# Patient Record
Sex: MALE | Race: BLACK OR AFRICAN AMERICAN | Employment: FULL TIME | URBAN - METROPOLITAN AREA
[De-identification: names, ages, dates, MRNs, and addresses within clinical notes are randomized per-mention and may not be internally consistent; named-entity substitution may affect disease eponyms.]

---

## 2018-06-14 ENCOUNTER — OFFICE VISIT (OUTPATIENT)
Dept: ORTHOPEDIC SURGERY | Age: 58
End: 2018-06-14

## 2018-06-14 VITALS
HEART RATE: 76 BPM | OXYGEN SATURATION: 94 % | HEIGHT: 68 IN | WEIGHT: 154 LBS | TEMPERATURE: 97.5 F | DIASTOLIC BLOOD PRESSURE: 72 MMHG | SYSTOLIC BLOOD PRESSURE: 114 MMHG | RESPIRATION RATE: 16 BRPM | BODY MASS INDEX: 23.34 KG/M2

## 2018-06-14 DIAGNOSIS — M25.561 RIGHT KNEE PAIN, UNSPECIFIED CHRONICITY: Primary | ICD-10-CM

## 2018-06-14 DIAGNOSIS — M54.5 BILATERAL LOW BACK PAIN, UNSPECIFIED CHRONICITY, WITH SCIATICA PRESENCE UNSPECIFIED: ICD-10-CM

## 2018-06-14 DIAGNOSIS — M51.16 LUMBAR DISC DISEASE WITH RADICULOPATHY: ICD-10-CM

## 2018-06-14 DIAGNOSIS — S86.911A KNEE STRAIN, RIGHT, INITIAL ENCOUNTER: ICD-10-CM

## 2018-06-14 RX ORDER — DIAZEPAM 10 MG/1
10 TABLET ORAL
COMMUNITY
End: 2021-04-20

## 2018-06-14 RX ORDER — SERTRALINE HYDROCHLORIDE 100 MG/1
TABLET, FILM COATED ORAL DAILY
COMMUNITY
End: 2021-08-25

## 2018-06-14 RX ORDER — METHYLPREDNISOLONE 4 MG/1
TABLET ORAL
Qty: 1 DOSE PACK | Refills: 0 | Status: SHIPPED | OUTPATIENT
Start: 2018-06-14 | End: 2018-07-23 | Stop reason: ALTCHOICE

## 2018-06-14 RX ORDER — LORAZEPAM 1 MG/1
TABLET ORAL
COMMUNITY
End: 2021-04-20

## 2018-06-14 NOTE — PROGRESS NOTES
HISTORY OF PRESENT ILLNESS:  Mr. Everette Herzog is a 66 y.o. male who is here for consultation regarding \"right knee pain. \"  He points to pain along the lateral aspect of the right thigh, lateral aspect of the right knee and down the lateral aspect of the right leg. He is status post back surgery in [place] at the [facility] a few year ago. That surgery consisted of an L3-4 fusion, per the patient. He did fairly well after surgery. He did have sciatica prior to that and has had some sciatica since then. He has been doing well until recently until he started developing this pain. This pain is constant, is not aggravated by weightbearing activities. He has pain at rest as well as with weightbearing activities. He is currently wearing a soft brace on the right knee. He has not had any history of trauma to the right knee. He has not noted swelling of the right knee. He does not utilize ambulatory assist.  He is taking only Tylenol for discomfort. PHYSICAL EXAMINATION:  Clinical examination reveals a healthy-appearing thin 66 y.o.  gentleman in mild discomfort. He moves easily on and off the examination table. His soft brace is removed today from the right knee. His right knee examination is completely benign. He has no effusion or soft tissue swelling of the right knee. He has full range of motion of the right knee from full extension to flexion of 135 degrees. He has good ROM  of the right knee. He has good medial laterals and good posterior anterior stability  of the right knee. He has good collateral as well as cruciate ligaments of the right knee. Lachman's test is negative. Latoya's test is negative. Anterior posterior drawer tests are negative. He has good passive range of motion of the right hip without discomfort. Straight leg raise test on the right side does reproduce some pain in the right lower extremity.   Straight leg raise test on the left is negative. Tariq's test on the right side results in some pain along the lateral aspect of the right thigh. Neurovascularly intact to the lower extremities proximally and distally to motor strength and sensation. RADIOGRAPHS:  X-rays of his knees reveal no osseous pathology. He has good joint space in the weightbearing portion of both knees. X-rays of his lumbar spine reveal fusion with pedicle screw fixation at the L3-4 level and cage. He has some mild degenerative disc disease above and below this fusion. IMPRESSION:    1. Degenerative disc disease, lumbar spine, with radicular pain. 2. Right knee strain. RECOMMENDATIONS:  I discussed with the patient that I do not think his symptoms are originating from his right knee. I think it is neurogenic in origin. His wife agrees with this. I started him on a Medrol Dosepak. He has had some success with this in the past.  He will also increase his Neurontin to twice a day. I have referred him to the [address] [place] [address] for further workup and MRI's as necessary having had previous back surgery. All their questions were answered today. Vitals:    06/14/18 1436   BP: 114/72   Pulse: 76   Resp: 16   Temp: 97.5 °F (36.4 °C)   TempSrc: Oral   SpO2: 94%   Weight: 154 lb (69.9 kg)   Height: 5' 7.5\" (1.715 m)   PainSc:   7   PainLoc: Knee       There is no problem list on file for this patient. There are no active problems to display for this patient. Current Outpatient Prescriptions   Medication Sig Dispense Refill    LORazepam (ATIVAN) 1 mg tablet Take  by mouth every four (4) hours as needed for Anxiety.  sertraline (ZOLOFT) 100 mg tablet Take  by mouth daily.  diazePAM (VALIUM) 10 mg tablet Take 10 mg by mouth every six (6) hours as needed for Anxiety.        Allergies   Allergen Reactions    Ibuprofen Rash     Past Medical History:   Diagnosis Date    Anxiety      Past Surgical History:   Procedure Laterality Date  HX BACK SURGERY      SPINAL FUSION,ANT,EA ADNL LEVEL       Family History   Problem Relation Age of Onset    Heart Disease Mother     Cancer Father      Social History   Substance Use Topics    Smoking status: Current Every Day Smoker    Smokeless tobacco: Never Used    Alcohol use Yes

## 2018-06-14 NOTE — LETTER
NOTIFICATION RETURN TO WORK / SCHOOL 
 
6/14/2018 2:57 PM 
 
Mr. Jonathan Lang. 
[address] [facility] [address] To Whom It May Concern: 
 
Yang Geiger Sr. is currently under the care of [address] [place] [address] [place]. Do to his current medical condition, it is advised for him to minimize going up and down steps. It is recommended that he stay on level surfaces as much as possible. If there are questions or concerns please have the patient contact our office. Sincerely, Sandrita Mi MD

## 2018-07-10 ENCOUNTER — TELEPHONE (OUTPATIENT)
Dept: ORTHOPEDIC SURGERY | Age: 58
End: 2018-07-10

## 2018-07-10 NOTE — TELEPHONE ENCOUNTER
Spoke with patient and he states that he has paperwork for Dr. Lucia Smith. I advised him to drop it off at the office and we will have it done in 7-10 business days.

## 2018-07-10 NOTE — TELEPHONE ENCOUNTER
Patient called requesting a call back from Dr. Zenon Gill regarding some paperwork he received from his job. That is all the information that was given. Please advise patient back at [phone].

## 2018-07-20 ENCOUNTER — OFFICE VISIT (OUTPATIENT)
Dept: ORTHOPEDIC SURGERY | Age: 58
End: 2018-07-20

## 2018-07-20 VITALS
SYSTOLIC BLOOD PRESSURE: 106 MMHG | WEIGHT: 157 LBS | DIASTOLIC BLOOD PRESSURE: 70 MMHG | TEMPERATURE: 98.4 F | HEART RATE: 75 BPM | OXYGEN SATURATION: 94 % | BODY MASS INDEX: 23.79 KG/M2 | HEIGHT: 68 IN

## 2018-07-20 DIAGNOSIS — M54.5 BILATERAL LOW BACK PAIN, UNSPECIFIED CHRONICITY, WITH SCIATICA PRESENCE UNSPECIFIED: ICD-10-CM

## 2018-07-20 DIAGNOSIS — M51.16 LUMBAR DISC DISEASE WITH RADICULOPATHY: Primary | ICD-10-CM

## 2018-07-20 NOTE — PROGRESS NOTES
HISTORY OF PRESENT ILLNESS:  Adela Cárdenas is here with his wife today for some paperwork. When I last saw him on June 14, 2018, he had been referred to The [address] [place] [address], but he stated they never called. He was instructed to make an appointment when he left the office last time. He had previous lumbar spine surgery and had radiculopathy in his lower extremities. He does feel that the Medrol Dosepak helped him, and I also increased his Neurontin a little bit. He is much better than he was when I last saw him. PHYSICAL EXAMINATION:   Clinical examination today reveals he is sitting on the exam table without significant discomfort. Sitting straight leg raise test is negative bilaterally. Currently, neurovascular testing is intact to the lower extremities proximal and distal to motor strength and sensation. IMPRESSION:  Multiple level degenerative disc disease, lumbar spine with previous lumbar spine surgery. RECOMMENDATIONS:  He needs to be [facility] up by [facility]. He will continue on his increased dose of Neurontin. This may be adjusted by [facility]. Further work notes will come from [facility] pending his final diagnosis and weight restrictions. All of their questions were answered today. Vitals:    07/20/18 1558   BP: 106/70   Pulse: 75   Temp: 98.4 °F (36.9 °C)   TempSrc: Oral   SpO2: 94%   Weight: 157 lb (71.2 kg)   Height: 5' 7.5\" (1.715 m)   PainSc:   4   PainLoc: Back       There is no problem list on file for this patient. There are no active problems to display for this patient. Current Outpatient Prescriptions   Medication Sig Dispense Refill    LORazepam (ATIVAN) 1 mg tablet Take  by mouth every four (4) hours as needed for Anxiety.  sertraline (ZOLOFT) 100 mg tablet Take  by mouth daily.  diazePAM (VALIUM) 10 mg tablet Take 10 mg by mouth every six (6) hours as needed for Anxiety.       methylPREDNISolone (MEDROL DOSEPACK) 4 mg tablet Per dose pack instructions 1 Dose Pack 0     Allergies   Allergen Reactions    Ibuprofen Rash     Past Medical History:   Diagnosis Date    Anxiety      Past Surgical History:   Procedure Laterality Date    HX BACK SURGERY      SPINAL FUSION,ANT,EA ADNL LEVEL       Family History   Problem Relation Age of Onset    Heart Disease Mother     Cancer Father      Social History   Substance Use Topics    Smoking status: Current Every Day Smoker    Smokeless tobacco: Never Used    Alcohol use Yes

## 2018-07-23 ENCOUNTER — OFFICE VISIT (OUTPATIENT)
Dept: ORTHOPEDIC SURGERY | Age: 58
End: 2018-07-23

## 2018-07-23 ENCOUNTER — DOCUMENTATION ONLY (OUTPATIENT)
Dept: ORTHOPEDIC SURGERY | Age: 58
End: 2018-07-23

## 2018-07-23 VITALS
BODY MASS INDEX: 24.96 KG/M2 | OXYGEN SATURATION: 100 % | HEART RATE: 54 BPM | SYSTOLIC BLOOD PRESSURE: 144 MMHG | RESPIRATION RATE: 16 BRPM | TEMPERATURE: 97.8 F | WEIGHT: 159 LBS | HEIGHT: 67 IN | DIASTOLIC BLOOD PRESSURE: 87 MMHG

## 2018-07-23 DIAGNOSIS — M62.830 MUSCLE SPASM OF BACK: ICD-10-CM

## 2018-07-23 DIAGNOSIS — M47.816 LUMBAR FACET ARTHROPATHY: ICD-10-CM

## 2018-07-23 DIAGNOSIS — M96.1 LUMBAR POST-LAMINECTOMY SYNDROME: ICD-10-CM

## 2018-07-23 DIAGNOSIS — Z72.0 TOBACCO USE: ICD-10-CM

## 2018-07-23 DIAGNOSIS — M54.16 LUMBAR RADICULAR PAIN: Primary | ICD-10-CM

## 2018-07-23 DIAGNOSIS — R29.898 WEAKNESS OF BOTH LEGS: ICD-10-CM

## 2018-07-23 RX ORDER — GABAPENTIN 300 MG/1
CAPSULE ORAL
Qty: 180 CAP | Refills: 3 | Status: SHIPPED | OUTPATIENT
Start: 2018-07-23 | End: 2018-09-04 | Stop reason: SDUPTHER

## 2018-07-23 NOTE — MR AVS SNAPSHOT
[address] [address] [address] [place] [address] 
[phone] Patient: Anna Mariee Sr. MRN: [id number] ZVM:[id number]/4/[id number] Visit Information Date & Time Provider Department Dept. Phone Encounter #  
 7/23/2018  8:00 AM Mary Lucero, [address] [facility] [phone] [id number] Follow-up Instructions Return in about 1 month (around 8/23/2018) for Medication follow up, Diagnostic Test follow up. Upcoming Health Maintenance Date Due Hepatitis C Screening 1960 Pneumococcal 19-64 Medium Risk (1 of 1 - PPSV23) 3/1/1981 DTaP/Tdap/Td series (1 - Tdap) 3/1/1983 FOBT Q 1 YEAR AGE 50-75 3/1/2012 Influenza Age 5 to Adult 8/1/2018 Allergies as of 7/23/2018  Review Complete On: 7/23/2018 By: Mary Lucero MD  
  
 Severity Noted Reaction Type Reactions Ibuprofen  06/14/2018    Rash Current Immunizations  Never Reviewed No immunizations on file. Not reviewed this visit You Were Diagnosed With   
  
 Codes Comments Lumbar radicular pain    -  Primary ICD-10-CM: M54.16 
ICD-9-CM: 724.4 Lumbar facet arthropathy (HCC)     ICD-10-CM: M46.96 
ICD-9-CM: 721.3 Muscle spasm of back     ICD-10-CM: V65.298 ICD-9-CM: 724.8 Weakness of both legs     ICD-10-CM: R29.898 ICD-9-CM: 729.89 Vitals BP Pulse Temp Resp Height(growth percentile) Weight(growth percentile) 144/87 (BP 1 Location: Left arm, BP Patient Position: Sitting) (!) 54 97.8 °F (36.6 °C) (Oral) 16 5' 7\" (1.702 m) 159 lb (72.1 kg) SpO2 BMI Smoking Status 100% 24.9 kg/m2 Current Every Day Smoker BMI and BSA Data Body Mass Index Body Surface Area 24.9 kg/m 2 1.85 m 2 Preferred Pharmacy Pharmacy Name Phone [id number] Leti Kearns, [facility] [address] [phone] Your Updated Medication List  
  
   
 This list is accurate as of 18  9:10 AM.  Always use your most recent med list.  
  
  
  
  
 diazePAM 10 mg tablet Commonly known as:  VALIUM Take 10 mg by mouth every six (6) hours as needed for Anxiety. gabapentin 300 mg capsule Commonly known as:  NEURONTIN  
2 po tid as directed  Indications: NEUROPATHIC PAIN  
  
 LORazepam 1 mg tablet Commonly known as:  ATIVAN Take  by mouth every four (4) hours as needed for Anxiety. ZOLOFT 100 mg tablet Generic drug:  sertraline Take  by mouth daily. Prescriptions Sent to Pharmacy Refills  
 gabapentin (NEURONTIN) 300 mg capsule 3 Si po tid as directed  Indications: NEUROPATHIC PAIN Class: Normal  
 Pharmacy: [address] #: [phone] Follow-up Instructions Return in about 1 month (around 2018) for Medication follow up, Diagnostic Test follow up. To-Do List   
 2018 Imaging:  MRI LUMB SPINE WO CONT   
  
 2018 8:00 AM  
  Appointment with [facility] MRI  2 at [address] [place] [address] ([phone]) GENERAL INSTRUCTIONS  Bring information (ID card) if you have any medically implanted devices. You will be required to lie still while the procedure is being performed. Remove any jewelry (including body piercing, hairpins) prior to MRI. If you have had a creatinine level drawn within the past 30 days, please bring most recent results to your appt. Bring any films, CD's, and reports related to your study with you on the day of your exam.  This only includes studies done outside of [address] [place] [address], Hospitals in [place], [place], and [place]. Bring a complete list of all medications you are currently taking to include prescriptions, over-the-counter meds, herbals, vitamins & any dietary supplements.   If you were given medications for claustrophobia or anxiety, please arrange to have someone drive you to your appointment. QUESTIONS  Notify the MRI Department if you have any questions concerning your study. Darryl Ivan - [phone] [facility] - [address] [place] [address] [facility] - [phone] Patient Instructions Learning About Benefits From Quitting Smoking How does quitting smoking make you healthier? If you're thinking about quitting smoking, you may have a few reasons to be smoke-free. Your health may be one of them. · When you quit smoking, you lower your risks for cancer, lung disease, heart attack, stroke, blood vessel disease, and blindness from macular degeneration. · When you're smoke-free, you get sick less often, and you heal faster. You are less likely to get colds, flu, bronchitis, and pneumonia. · As a nonsmoker, you may find that your mood is better and you are less stressed. When and how will you feel healthier? Quitting has real health benefits that start from day 1 of being smoke-free. And the longer you stay smoke-free, the healthier you get and the better you feel. The first hours · After just 20 minutes, your blood pressure and heart rate go down. That means there's less stress on your heart and blood vessels. · Within 12 hours, the level of carbon monoxide in your blood drops back to normal. That makes room for more oxygen. With more oxygen in your body, you may notice that you have more energy than when you smoked. After 2 weeks · Your lungs start to work better. · Your risk of heart attack starts to drop. After 1 month · When your lungs are clear, you cough less and breathe deeper, so it's easier to be active. · Your sense of taste and smell return. That means you can enjoy food more than you have since you started smoking. Over the years · After 1 year, your risk of heart disease is half what it would be if you kept smoking. · After 5 years, your risk of stroke starts to shrink.  Within a few years after that, it's about the same as if you'd never smoked. · After 10 years, your risk of dying from lung cancer is cut by about half. And your risk for many other types of cancer is lower too. How would quitting help others in your life? When you quit smoking, you improve the health of everyone who now breathes in your smoke. · Their heart, lung, and cancer risks drop, much like yours. · They are sick less. For babies and small children, living smoke-free means they're less likely to have ear infections, pneumonia, and bronchitis. · If you're a woman who is or will be pregnant someday, quitting smoking means a healthier . · Children who are close to you are less likely to become adult smokers. Where can you learn more? Go to http://danielmyContactCardmarvin.info/. Enter [id number] [id number] [id number] 11 in the search box to learn more about \"Learning About Benefits From Quitting Smoking. \" Current as of: 2017 Content Version: 11.7 © 3500-2221 [employer]. Care instructions adapted under license by [employer] (which disclaims liability or warranty for this information). If you have questions about a medical condition or this instruction, always ask your healthcare professional. [place] [address] any warranty or liability for your use of this information. Low Back Arthritis: Exercises Your Care Instructions Here are some examples of typical rehabilitation exercises for your condition. Start each exercise slowly. Ease off the exercise if you start to have pain. Your doctor or physical therapist will tell you when you can start these exercises and which ones will work best for you. When you are not being active, find a comfortable position for rest. Some people are comfortable on the floor or a medium-firm bed with a small pillow under their head and another under their knees.  Some people prefer to lie on their side with a pillow between their knees. Don't stay in one position for too long. Take short walks (10 to 20 minutes) every 2 to 3 hours. Avoid slopes, hills, and stairs until you feel better. Walk only distances you can manage without pain, especially leg pain. How to do the exercises Pelvic tilt 1. Lie on your back with your knees bent. 2. \"Brace\" your stomach-tighten your muscles by pulling in and imagining your belly button moving toward your spine. 3. Press your lower back into the floor. You should feel your hips and pelvis rock back. 4. Hold for 6 seconds while breathing smoothly. 5. Relax and allow your pelvis and hips to rock forward. 6. Repeat 8 to 12 times. Back stretches 1. Get down on your hands and knees on the floor. 2. Relax your head and allow it to droop. Round your back up toward the ceiling until you feel a nice stretch in your upper, middle, and lower back. Hold this stretch for as long as it feels comfortable, or about 15 to 30 seconds. 3. Return to the starting position with a flat back while you are on your hands and knees. 4. Let your back sway by pressing your stomach toward the floor. Lift your buttocks toward the ceiling. 5. Hold this position for 15 to 30 seconds. 6. Repeat 2 to 4 times. Follow-up care is a key part of your treatment and safety. Be sure to make and go to all appointments, and call your doctor if you are having problems. It's also a good idea to know your test results and keep a list of the medicines you take. Where can you learn more? Go to http://daniel-marvin.info/. Enter Y395 in the search box to learn more about \"Low Back Arthritis: Exercises. \" Current as of: November 29, 2017 Content Version: 11.7 © 6306-7947 [employer].  Care instructions adapted under license by [employer] (which disclaims liability or warranty for this information). If you have questions about a medical condition or this instruction, always ask your healthcare professional. [address] any warranty or liability for your use of this information. Introducing [facility] SERVICES! [employer] introduces Vupen patient portal. Now you can access parts of your medical record, email your doctor's office, and request medication refills online. 1. In your internet browser, go to [email or web address] XII 2. Click on the First Time User? Click Here link in the Sign In box. You will see the New Member Sign Up page. 3. Enter your Vupen Access Code exactly as it appears below. You will not need to use this code after youve completed the sign-up process. If you do not sign up before the expiration date, you must request a new code. · Vupen Access Code: [email or web address] Expires: 10/18/2018  4:20 PM 
 
4. Enter the last four digits of your Social Security Number (xxxx) and Date of Birth (mm/dd/yyyy) as indicated and click Submit. You will be taken to the next sign-up page. 5. Create a Vupen ID. This will be your Vupen login ID and cannot be changed, so think of one that is secure and easy to remember. 6. Create a Vupen password. You can change your password at any time. 7. Enter your Password Reset Question and Answer. This can be used at a later time if you forget your password. 8. Enter your e-mail address. You will receive e-mail notification when new information is available in [address] 9. Click Sign Up. You can now view and download portions of your medical record. 10. Click the Download Summary menu link to download a portable copy of your medical information. If you have questions, please visit the Frequently Asked Questions section of the Vupen website. Remember, Vupen is NOT to be used for urgent needs. For medical emergencies, dial 911. Now available from your iPhone and Android! Please provide this summary of care documentation to your next provider. Your primary care clinician is listed as Phys Other. If you have any questions after today's visit, please call [phone].

## 2018-07-23 NOTE — PROGRESS NOTES
[facility]  [address] [place], [address]  Phone: [phone]  Fax: [phone]    NEW PATIENT  Pt's YOB: 1960    ASSESSMENT   Diagnoses and all orders for this visit:    1. Lumbar radicular pain  -     gabapentin (NEURONTIN) 300 mg capsule; 2 po tid as directed  Indications: NEUROPATHIC PAIN  -     MRI LUMB SPINE WO CONT; Future    2. Lumbar facet arthropathy (HCC)  -     MRI LUMB SPINE WO CONT; Future    3. Muscle spasm of back  -     MRI LUMB SPINE WO CONT; Future    4. Lumbar post-laminectomy syndrome  -     gabapentin (NEURONTIN) 300 mg capsule; 2 po tid as directed  Indications: NEUROPATHIC PAIN    5. Weakness of both legs  -     MRI LUMB SPINE WO CONT; Future    6. Tobacco use         IMPRESSION AND PLAN:  Lonnie Dumont Sr. is a 66 y.o. right hand dominant male with history of lumbar pain low back pain. Pt complains of pain in the lower back that radiates down the right leg. Of note, he had a prior L3-4 fusion, and has tried physical therapy, Ultram 50 mg, Zanaflex, and Neuronin. Pt experienced significant improvement when he was on a prednisone and currently takes Neurontin 300 mg 2 tabs BID with minimal relief in his leg pain. 1) Pt was given information on lumbar arthritis exercises. 2) A lumbar MRI was ordered. 3) He will increase his Neurontin 300 mg to 2 tabs TID, tapering up as directed. 4) I strongly encouraged the Pt to quit smoking and gave information on smoking cessation. 5) I would not recommend any use of vertical ladders due to his previous lumbar surgery and history of lower back pain. 6) Mr. Kimberly Carmichael has a reminder for a \"due or due soon\" health maintenance. I have asked that he contact his primary care provider, Phys Other, MD, for follow-up on this health maintenance. 7)  demonstrated consistency with prescribing.    8) Pt will follow-up in 3-4 weeks or sooner if needed. HISTORY OF PRESENT ILLNESS:  Kimberlee Bernal Sr. is a 66 y.o. right hand dominant male with history of lumbar pain low back pain. He presents to the office today as a new patient referred by Dr. Christen Ortiz. Pt complains of pain in the lower back that radiates down the right leg. He notes that years ago he worked for [employer] in the [employer] department and when he lifted up a container he felt a pop. Pt states that upon waking the next morning he could barely move due to pain. Pt then attended physical therapy, had x-rays, and was told to immediately discontinue sessions and follow up with surgeon Dr. Belle Silveira in [place]. He then had a L3-4 fusion. Pt states that at the time of his injury he experienced pain in the lower back and right leg (same location as his pain today). He notes that the surgery was supposed to be a 45 minute operation but since his condition was worse than expected the surgery took about 5 hours. He notes that he continued to experience pain in the lower back and leg and tried physical therapy for 3 years after the surgery. Pt notes that his post surgery pain was not as severe but he states that he did continue to experience pain after the surgery. Pt notes that he started taking Percocet after the surgery with benefit but states that is caused drowsiness. He states that he also tried Ultram 50 mg, Zanaflex, and Neuronin. Pt denies ever trying steroid injections in the lumbar spine. He states that the moved to [place] this year to start a new job with the same company. Of note, he now works as a . 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 He followed up with Dr. Christen Ortiz for knee pain and was told his pain was related to sciatica. Pt experienced significant improvement when he was on a prednisone but this was only temporary.  He currently takes Neurontin 300 mg 2 tabs BID with minimal relief in his leg pain. Pt denies any sedation when taking the Neurontin. He denies any history of renal issues and states that ibuprofen causes a rash. Pt at this time desires to proceed with medication evaluation and a lumbar MRI. Of note, patient is a smoker. Pain Scale: 6/10     PCP: Lisa Williamson MD    Past Medical History:   Diagnosis Date    Anxiety         Social History     Social History    Marital status:      Spouse name: N/A    Number of children: N/A    Years of education: N/A     Occupational History    Not on file. Social History Main Topics    Smoking status: Current Every Day Smoker    Smokeless tobacco: Never Used    Alcohol use Yes    Drug use: Not on file    Sexual activity: Not on file     Other Topics Concern    Not on file     Social History Narrative       Current Outpatient Prescriptions   Medication Sig Dispense Refill    gabapentin (NEURONTIN) 300 mg capsule 2 po tid as directed  Indications: NEUROPATHIC PAIN 180 Cap 3    sertraline (ZOLOFT) 100 mg tablet Take  by mouth daily.  LORazepam (ATIVAN) 1 mg tablet Take  by mouth every four (4) hours as needed for Anxiety.  diazePAM (VALIUM) 10 mg tablet Take 10 mg by mouth every six (6) hours as needed for Anxiety. Allergies   Allergen Reactions    Ibuprofen Rash       REVIEW OF SYSTEMS    Constitutional: Negative for fever, chills, or weight change. Respiratory: Negative for cough or shortness of breath. Cardiovascular: Negative for chest pain or palpitations. Gastrointestinal: Negative for acid reflux, change in bowel habits, or constipation. Genitourinary: Negative for dysuria and flank pain. Musculoskeletal: Positive for lumbar pain. Skin: Negative for rash. Neurological: Negative for headaches, dizziness, or numbness. Endo/Heme/Allergies: Negative for increased bruising. Psychiatric/Behavioral: Negative for difficulty with sleep.     PHYSICAL EXAMINATION  Visit Vitals    /87 (BP 1 Location: Left arm, BP Patient Position: Sitting)    Pulse (!) 54    Temp 97.8 °F (36.6 °C) (Oral)    Resp 16    Ht 5' 7\" (1.702 m)    Wt 159 lb (72.1 kg)    SpO2 100%    BMI 24.9 kg/m2       Constitutional: Awake, alert, and in no acute distress. HEENT: Normocephalic. Atraumatic. Oropharynx is moist and clear. PERRL. EOMI. Sclerae are nonicteric  Heart: Regular rate and rhythm  Lungs: Clear to auscultation bilaterally  Abdomen: Soft and nontender. Bowel sounds are present  Neurological: 1+ symmetrical DTRs in the upper extremities. 1+ symmetrical DTRs in the lower extremities. Sensation to light touch is intact. Negative Triston's sign bilaterally. Skin: warm, dry, and intact. Musculoskeletal: Good range of motion in the cervical spine on all planes. Tenderness to palpation in the lower lumbar region. Pain with extension, axial loading, and forward flexion. No pain with internal or external rotation of his hips. Positive straight leg raise bilaterally. Positive slump test on the right. Some difficulty with heel walking, no difficulty with toe walking. Difficulty with the single leg stand bilaterally. Biceps  Triceps Deltoids Wrist Ext Wrist Flex Hand Intrin   Right +4/5 +4/5 +4/5 +4/5 +4/5 +4/5   Left +4/5 +4/5 +4/5 +4/5 +4/5 +4/5      Hip Flex  Quads Hamstrings Ankle DF EHL Ankle PF   Right  4/5 +4/5 +4/5 +4/5 +4/5 +4/5   Left  4/5 +4/5 +4/5 +4/5 +4/5 +4/5     IMAGING:    Lumbar spine x-rays from 06/14/2018 were personally reviewed with the patient and demonstrated:  Pt has an L3-4 fusion. Hardware appears intact. Multilevel degenerative facets. Written by Villa Nolan, as dictated by Jasson Breaux MD.  I, Dr. Jasson Breaux confirm that all documentation is accurate.

## 2018-07-23 NOTE — PATIENT INSTRUCTIONS
Learning About Benefits From Quitting Smoking  How does quitting smoking make you healthier? If you're thinking about quitting smoking, you may have a few reasons to be smoke-free. Your health may be one of them. · When you quit smoking, you lower your risks for cancer, lung disease, heart attack, stroke, blood vessel disease, and blindness from macular degeneration. · When you're smoke-free, you get sick less often, and you heal faster. You are less likely to get colds, flu, bronchitis, and pneumonia. · As a nonsmoker, you may find that your mood is better and you are less stressed. When and how will you feel healthier? Quitting has real health benefits that start from day 1 of being smoke-free. And the longer you stay smoke-free, the healthier you get and the better you feel. The first hours  · After just 20 minutes, your blood pressure and heart rate go down. That means there's less stress on your heart and blood vessels. · Within 12 hours, the level of carbon monoxide in your blood drops back to normal. That makes room for more oxygen. With more oxygen in your body, you may notice that you have more energy than when you smoked. After 2 weeks  · Your lungs start to work better. · Your risk of heart attack starts to drop. After 1 month  · When your lungs are clear, you cough less and breathe deeper, so it's easier to be active. · Your sense of taste and smell return. That means you can enjoy food more than you have since you started smoking. Over the years  · After 1 year, your risk of heart disease is half what it would be if you kept smoking. · After 5 years, your risk of stroke starts to shrink. Within a few years after that, it's about the same as if you'd never smoked. · After 10 years, your risk of dying from lung cancer is cut by about half. And your risk for many other types of cancer is lower too. How would quitting help others in your life?   When you quit smoking, you improve the health of everyone who now breathes in your smoke. · Their heart, lung, and cancer risks drop, much like yours. · They are sick less. For babies and small children, living smoke-free means they're less likely to have ear infections, pneumonia, and bronchitis. · If you're a woman who is or will be pregnant someday, quitting smoking means a healthier . · Children who are close to you are less likely to become adult smokers. Where can you learn more? Go to http://daniel-marvin.info/. Enter 052 [id number] 72 11 in the search box to learn more about \"Learning About Benefits From Quitting Smoking. \"  Current as of: 2017  Content Version: 11.7  © 6068-5143 [employer]. Care instructions adapted under license by [employer] (which disclaims liability or warranty for this information). If you have questions about a medical condition or this instruction, always ask your healthcare professional. [place] [address] any warranty or liability for your use of this information. Low Back Arthritis: Exercises  Your Care Instructions  Here are some examples of typical rehabilitation exercises for your condition. Start each exercise slowly. Ease off the exercise if you start to have pain. Your doctor or physical therapist will tell you when you can start these exercises and which ones will work best for you. When you are not being active, find a comfortable position for rest. Some people are comfortable on the floor or a medium-firm bed with a small pillow under their head and another under their knees. Some people prefer to lie on their side with a pillow between their knees. Don't stay in one position for too long. Take short walks (10 to 20 minutes) every 2 to 3 hours. Avoid slopes, hills, and stairs until you feel better. Walk only distances you can manage without pain, especially leg pain. How to do the exercises  Pelvic tilt    1.  Lie on your back with your knees bent. 2. \"Brace\" your stomach-tighten your muscles by pulling in and imagining your belly button moving toward your spine. 3. Press your lower back into the floor. You should feel your hips and pelvis rock back. 4. Hold for 6 seconds while breathing smoothly. 5. Relax and allow your pelvis and hips to rock forward. 6. Repeat 8 to 12 times. Back stretches    1. Get down on your hands and knees on the floor. 2. Relax your head and allow it to droop. Round your back up toward the ceiling until you feel a nice stretch in your upper, middle, and lower back. Hold this stretch for as long as it feels comfortable, or about 15 to 30 seconds. 3. Return to the starting position with a flat back while you are on your hands and knees. 4. Let your back sway by pressing your stomach toward the floor. Lift your buttocks toward the ceiling. 5. Hold this position for 15 to 30 seconds. 6. Repeat 2 to 4 times. Follow-up care is a key part of your treatment and safety. Be sure to make and go to all appointments, and call your doctor if you are having problems. It's also a good idea to know your test results and keep a list of the medicines you take. Where can you learn more? Go to http://daniel-marvin.info/. Enter Z079 in the search box to learn more about \"Low Back Arthritis: Exercises. \"  Current as of: November 29, 2017  Content Version: 11.7  © 4165-7672 [employer]. Care instructions adapted under license by [employer] (which disclaims liability or warranty for this information). If you have questions about a medical condition or this instruction, always ask your healthcare professional. [place] [address] any warranty or liability for your use of this information.

## 2018-07-30 ENCOUNTER — HOSPITAL ENCOUNTER (OUTPATIENT)
Age: 58
Discharge: HOME | End: 2018-07-30
Attending: PHYSICAL MEDICINE & REHABILITATION
Payer: COMMERCIAL

## 2018-07-30 DIAGNOSIS — R29.898 WEAKNESS OF BOTH LEGS: ICD-10-CM

## 2018-07-30 DIAGNOSIS — M54.16 LUMBAR RADICULAR PAIN: ICD-10-CM

## 2018-07-30 DIAGNOSIS — M47.816 LUMBAR FACET ARTHROPATHY: ICD-10-CM

## 2018-07-30 DIAGNOSIS — M62.830 MUSCLE SPASM OF BACK: ICD-10-CM

## 2018-07-30 PROCEDURE — 72148 MRI LUMBAR SPINE W/O DYE: CPT

## 2018-08-20 ENCOUNTER — DOCUMENTATION ONLY (OUTPATIENT)
Dept: ORTHOPEDIC SURGERY | Age: 58
End: 2018-08-20

## 2018-08-20 NOTE — PROGRESS NOTES
Called patient 3x each time someone answered but could not hear them if they call back please transfer to my line [phone].

## 2018-08-22 ENCOUNTER — DOCUMENTATION ONLY (OUTPATIENT)
Dept: ORTHOPEDIC SURGERY | Age: 58
End: 2018-08-22

## 2018-08-22 NOTE — PROGRESS NOTES
Spoke with pt again today to come and pay 20.00 form fee for form that has been here since 7/23 states he would come in today

## 2018-08-30 ENCOUNTER — DOCUMENTATION ONLY (OUTPATIENT)
Dept: ORTHOPEDIC SURGERY | Age: 58
End: 2018-08-30

## 2018-08-30 NOTE — PROGRESS NOTES
Spoke with pt on 2 different occasions where he said he would come in to pay for form. . Form has been scanned into the system if he decides to come in and pay

## 2018-09-04 ENCOUNTER — OFFICE VISIT (OUTPATIENT)
Dept: ORTHOPEDIC SURGERY | Age: 58
End: 2018-09-04

## 2018-09-04 VITALS
SYSTOLIC BLOOD PRESSURE: 126 MMHG | HEART RATE: 81 BPM | DIASTOLIC BLOOD PRESSURE: 86 MMHG | BODY MASS INDEX: 24.8 KG/M2 | HEIGHT: 67 IN | WEIGHT: 158 LBS

## 2018-09-04 DIAGNOSIS — Z98.1 S/P LUMBAR FUSION: ICD-10-CM

## 2018-09-04 DIAGNOSIS — M96.1 LUMBAR POST-LAMINECTOMY SYNDROME: Primary | ICD-10-CM

## 2018-09-04 DIAGNOSIS — M62.838 MUSCLE SPASM: ICD-10-CM

## 2018-09-04 DIAGNOSIS — M47.816 LUMBAR FACET ARTHROPATHY: ICD-10-CM

## 2018-09-04 DIAGNOSIS — M54.16 LUMBAR RADICULAR PAIN: ICD-10-CM

## 2018-09-04 DIAGNOSIS — Z72.0 TOBACCO USE: ICD-10-CM

## 2018-09-04 RX ORDER — GABAPENTIN 300 MG/1
CAPSULE ORAL
Qty: 180 CAP | Refills: 3 | Status: SHIPPED | OUTPATIENT
Start: 2018-09-04 | End: 2021-04-20

## 2018-09-04 RX ORDER — MELOXICAM 7.5 MG/1
TABLET ORAL
Qty: 180 TAB | Refills: 1 | Status: SHIPPED | OUTPATIENT
Start: 2018-09-04 | End: 2021-09-27

## 2018-09-04 NOTE — PROGRESS NOTES
[facility]  [address] [place], [facility] [address]  Phone: [phone]  Fax: [phone]    Pt's YOB: 1960    ASSESSMENT   Diagnoses and all orders for this visit:    1. Lumbar post-laminectomy syndrome  -     gabapentin (NEURONTIN) 300 mg capsule; 2 po tid as directed  Indications: NEUROPATHIC PAIN    2. Lumbar facet arthropathy (HCC)  -     meloxicam (MOBIC) 7.5 mg tablet; 1 po bid as needed for pain -- take with food    3. Lumbar radicular pain  -     gabapentin (NEURONTIN) 300 mg capsule; 2 po tid as directed  Indications: NEUROPATHIC PAIN    4. Muscle spasm    5. S/P lumbar fusion    6. Tobacco use         IMPRESSION AND PLAN:  Vernelle Sacks Sr. is a 66 y.o. right hand dominant male with history of lumbar pain. Pt complains of pain in the right buttock that generally extends into the knee, and occasionally into the right calf. He experienced significant improvement with a Medrol Dosepak. 1) Pt was given information on lumbar arthritis exercises. 2) I highly recommended the patient try water exercise -- he states he will set this up on his own at the [facility]  3) Pt may also try low impact exercises, héctor chi, pilates, or chair/beginner's yoga. 4) He will increase his Neurontin 300 mg to 2 tabs TID to better manage his symptoms. 5) Pt was prescribed Mobic 7.5 mg 1 tab BID prn with meals. 6) Discussed trying steroid injections and a lumbar RFA if needed. 7) Mr. Ovi Billings has a reminder for a \"due or due soon\" health maintenance. I have asked that he contact his primary care provider, Lisa Williamson, MD, for follow-up on this health maintenance. 8)  demonstrated consistency with prescribing. 9) Smoking cessation recommended  10) Pt will follow-up in 3 months or sooner if needed. HISTORY OF PRESENT ILLNESS:  Vernelle Sacks Sr. is a 66 y.o. right hand dominant male with history of lumbar pain.  He presents to the office today for lumbar MRI follow up. Pt complains of pain in the right buttock that extends into the knee. He notes that his pain occasionally extends past the right knee and into the right calf. Pt complains of occasional cramping in the right thigh and calf. He experienced significant improvement in his pain when he tried a Medrol Dosepak. Pt increased his Neurontin 300 mg to 1 TID but it has not been effective. Of note, he had an L3-4 fusion in 2014 with Dr. Rowdy Mendez in [place]. Pt states that he used to work out and exercise regularly but has not been able to exercise due to his pain. He is hesitant about starting exercises again. Pt denies any history of renal or stomach issues and experiences a rash with ibuprofen. Of note he has tolerated Mobic in the past. Pt at this time desires to proceed with medication evaluation. Pain Scale: 6/10    PCP: Lisa Williamson MD     Past Medical History:   Diagnosis Date    Anxiety         Social History     Social History    Marital status:      Spouse name: N/A    Number of children: N/A    Years of education: N/A     Occupational History    Not on file. Social History Main Topics    Smoking status: Current Every Day Smoker    Smokeless tobacco: Never Used    Alcohol use Yes    Drug use: Not on file    Sexual activity: Not on file     Other Topics Concern    Not on file     Social History Narrative       Current Outpatient Prescriptions   Medication Sig Dispense Refill    gabapentin (NEURONTIN) 300 mg capsule 2 po tid as directed  Indications: NEUROPATHIC PAIN 180 Cap 3    meloxicam (MOBIC) 7.5 mg tablet 1 po bid as needed for pain -- take with food 180 Tab 1    sertraline (ZOLOFT) 100 mg tablet Take  by mouth daily.  LORazepam (ATIVAN) 1 mg tablet Take  by mouth every four (4) hours as needed for Anxiety.  diazePAM (VALIUM) 10 mg tablet Take 10 mg by mouth every six (6) hours as needed for Anxiety.          Allergies Allergen Reactions    Ibuprofen Rash         REVIEW OF SYSTEMS    Constitutional: Negative for fever, chills, or weight change. Respiratory: Negative for cough or shortness of breath. Cardiovascular: Negative for chest pain or palpitations. Gastrointestinal: Negative for acid reflux, change in bowel habits, or constipation. Genitourinary: Negative for dysuria and flank pain. Musculoskeletal: Positive for lumbar pain. Skin: Negative for rash. Neurological: Negative for headaches, dizziness, or numbness. Endo/Heme/Allergies: Negative for increased bruising. Psychiatric/Behavioral: Negative for difficulty with sleep. PHYSICAL EXAMINATION  Visit Vitals    /86    Pulse 81    Ht 5' 7\" (1.702 m)    Wt 158 lb (71.7 kg)    BMI 24.75 kg/m2       Constitutional: Awake, alert, and in no acute distress. Neurological: 1+ symmetrical DTRs in the lower extremities. Sensation to light touch is intact. Skin: warm, dry, and intact. Musculoskeletal: Tenderness to palpation in the lower lumbar region and over the sacroiliac joints. Pain with extension and axial loading. Improved with forward flexion. No pain with internal or external rotation of his hips. Negative straight leg raise bilaterally. No pain with toe walking. Pain with heel walking. Hip Flex  Quads Hamstrings Ankle DF EHL Ankle PF   Right +4/5 +4/5 +4/5 +4/5 +4/5 +4/5   Left +4/5 +4/5 +4/5 +4/5 +4/5 +4/5     IMAGING:    Lumbar spine MRI from 07/30/2018 was personally reviewed with the patient and demonstrated:    Results from [place] encounter on 07/30/18   MRI LUMB SPINE WO CONT   Narrative PROCEDURE:  MR lumbar spine without contrast.    INDICATION:  Back pain with right leg pain for years. New leg pain for several  weeks. Lumbar radicular pain. COMPARISON:  None.     TECHNIQUE:  Sagittal T1, FSE T2, FSEIR images are obtained without IV  gadolinium, supplemented by axial T1 and T2 images through selected levels of  the lumbar spine. FINDINGS:     A posterior lumbar interbody fusion has been performed across L3-L4 segment with  pedicle screws and interpedicular rods and with L3 laminectomy. Aside from the  expected magnetic susceptibility artifact associated with the fusion hardware  components, no abnormal vertebral marrow signal alteration is detected. No  acute subluxation. The conus medullaris is identified at L1 level. L1-2:  No significant disc pathology. No central canal or foraminal stenosis. L2-3:  No significant disc pathology. No central canal or foraminal stenosis. L3-4:  Fused level. Disc spacers are noted at the disc space. Endplate  irregularity with decreased disc height. Probably indicative of osseous fusion  in progress across the disc space. No convincing evidence for significant  residual disc/recurrent disc disease is demonstrated. No definite central canal  stenosis. No convincing evidence for foraminal narrowing. L4-5:  Mild disc bulge. Mild facet hypertrophy bilaterally. No central canal  stenosis. The neural foramina are difficult to evaluate due to the pedicle  screws that appear essentially patent with questionable minimal effacement in  the inferior aspect of the neural foramina. L5-S1:  Mild disc protrusion posterior centrally, without evidence of central  canal stenosis. Mild facet hypertrophy. No significant foraminal narrowing. Impression IMPRESSION:    1. L3-L4 posterior interbody fusion. No definite focal fused level abnormality  identifiable about current scan. Please note that there is no IV contrast used  for this study. 2.  Mild disc bulge at L4-5 and mild disc protrusion at L5-S1. No central canal  stenosis. L4-5 neural foramina are somewhat difficult to evaluate confidently  secondary to the magnetic susceptibility artifact but appear grossly patent with  only minimal effacement of the neural foramina inferiorly.     3.  Mild facet arthropathy at L4-5 and L5-S1. Written by Omari Jacob, as dictated by Shilpa Izaguirre MD.  I, Dr. Shilpa Izaguirre confirm that all documentation is accurate.

## 2018-09-04 NOTE — PATIENT INSTRUCTIONS

## 2018-09-04 NOTE — MR AVS SNAPSHOT
[address] [place] [id number] 
[phone] Patient: Brenton Arreaga Sr. MRN: [id number] OCK:[birth year] Visit Information Date & Time Provider Department Dept. Phone Encounter #  
 9/4/2018  3:45 PM Abel Essex, MD [facility] [phone] [id number] Follow-up Instructions Return in about 3 months (around 12/4/2018) for Medication follow up. Upcoming Health Maintenance Date Due Hepatitis C Screening 1960 Pneumococcal 19-64 Medium Risk (1 of 1 - PPSV23) 3/1/1981 DTaP/Tdap/Td series (1 - Tdap) 3/1/1983 FOBT Q 1 YEAR AGE 50-75 3/1/2012 Influenza Age 5 to Adult 8/1/2018 Allergies as of 9/4/2018  Review Complete On: 9/4/2018 By: Abel Essex, MD  
  
 Severity Noted Reaction Type Reactions Ibuprofen  06/14/2018    Rash Current Immunizations  Never Reviewed No immunizations on file. Not reviewed this visit You Were Diagnosed With   
  
 Codes Comments Lumbar facet arthropathy (HCC)    -  Primary ICD-10-CM: M46.96 
ICD-9-CM: 721.3 Muscle spasm     ICD-10-CM: C45.439 ICD-9-CM: 728.85 Neuritis     ICD-10-CM: M79.2 ICD-9-CM: 729.2 Lumbar radicular pain     ICD-10-CM: M54.16 
ICD-9-CM: 724.4 Lumbar post-laminectomy syndrome     ICD-10-CM: M96.1 ICD-9-CM: 722.83 Vitals BP Pulse Height(growth percentile) Weight(growth percentile) BMI Smoking Status 126/86 81 5' 7\" (1.702 m) 158 lb (71.7 kg) 24.75 kg/m2 Current Every Day Smoker Vitals History BMI and BSA Data Body Mass Index Body Surface Area 24.75 kg/m 2 1.84 m 2 Preferred Pharmacy Pharmacy Name Phone [id number] [place], [address] [place] [address] J [phone] Your Updated Medication List  
  
   
This list is accurate as of 9/4/18  5:08 PM.  Always use your most recent med list.  
  
  
  
 diazePAM 10 mg tablet Commonly known as:  VALIUM Take 10 mg by mouth every six (6) hours as needed for Anxiety. gabapentin 300 mg capsule Commonly known as:  NEURONTIN  
2 po tid as directed  Indications: NEUROPATHIC PAIN  
  
 LORazepam 1 mg tablet Commonly known as:  ATIVAN Take  by mouth every four (4) hours as needed for Anxiety. meloxicam 7.5 mg tablet Commonly known as:  MOBIC  
1 po bid as needed for pain -- take with food ZOLOFT 100 mg tablet Generic drug:  sertraline Take  by mouth daily. Prescriptions Sent to Pharmacy Refills  
 gabapentin (NEURONTIN) 300 mg capsule 3 Si po tid as directed  Indications: NEUROPATHIC PAIN Class: Normal  
 Pharmacy: [facility], [address] [facility] Ph #: [phone]  
 meloxicam (MOBIC) 7.5 mg tablet 1 Si po bid as needed for pain -- take with food Class: Normal  
 Pharmacy: [address] Ph #: [phone] Follow-up Instructions Return in about 3 months (around 2018) for Medication follow up. Patient Instructions Low Back Arthritis: Exercises Your Care Instructions Here are some examples of typical rehabilitation exercises for your condition. Start each exercise slowly. Ease off the exercise if you start to have pain. Your doctor or physical therapist will tell you when you can start these exercises and which ones will work best for you. When you are not being active, find a comfortable position for rest. Some people are comfortable on the floor or a medium-firm bed with a small pillow under their head and another under their knees. Some people prefer to lie on their side with a pillow between their knees. Don't stay in one position for too long. Take short walks (10 to 20 minutes) every 2 to 3 hours.  Avoid slopes, hills, and stairs until you feel better. Walk only distances you can manage without pain, especially leg pain. How to do the exercises Pelvic tilt 1. Lie on your back with your knees bent. 2. \"Brace\" your stomach-tighten your muscles by pulling in and imagining your belly button moving toward your spine. 3. Press your lower back into the floor. You should feel your hips and pelvis rock back. 4. Hold for 6 seconds while breathing smoothly. 5. Relax and allow your pelvis and hips to rock forward. 6. Repeat 8 to 12 times. Back stretches 1. Get down on your hands and knees on the floor. 2. Relax your head and allow it to droop. Round your back up toward the ceiling until you feel a nice stretch in your upper, middle, and lower back. Hold this stretch for as long as it feels comfortable, or about 15 to 30 seconds. 3. Return to the starting position with a flat back while you are on your hands and knees. 4. Let your back sway by pressing your stomach toward the floor. Lift your buttocks toward the ceiling. 5. Hold this position for 15 to 30 seconds. 6. Repeat 2 to 4 times. Follow-up care is a key part of your treatment and safety. Be sure to make and go to all appointments, and call your doctor if you are having problems. It's also a good idea to know your test results and keep a list of the medicines you take. Where can you learn more? Go to http://daniel-marvin.info/. Enter S300 in the search box to learn more about \"Low Back Arthritis: Exercises. \" Current as of: November 29, 2017 Content Version: 11.7 © 6958-5207 [employer]. Care instructions adapted under license by [employer] (which disclaims liability or warranty for this information). If you have questions about a medical condition or this instruction, always ask your healthcare professional. [address] any warranty or liability for your use of this information. Introducing [facility]! Shakira Mele introduces [employer] patient portal. Now you can access parts of your medical record, email your doctor's office, and request medication refills online. 1. In your internet browser, go to [email or web address]. [email or web address] 2. Click on the First Time User? Click Here link in the Sign In box. You will see the New Member Sign Up page. 3. Enter your [employer] Access Code exactly as it appears below. You will not need to use this code after youve completed the sign-up process. If you do not sign up before the expiration date, you must request a new code. · [employer] Access Code: [email or web address] Expires: 10/18/2018  4:20 PM 
 
4. Enter the last four digits of your Social Security Number (xxxx) and Date of Birth (mm/dd/yyyy) as indicated and click Submit. You will be taken to the next sign-up page. 5. Create a [employer] ID. This will be your [employer] login ID and cannot be changed, so think of one that is secure and easy to remember. 6. Create a [employer] password. You can change your password at any time. 7. Enter your Password Reset Question and Answer. This can be used at a later time if you forget your password. 8. Enter your e-mail address. You will receive e-mail notification when new information is available in [address] 9. Click Sign Up. You can now view and download portions of your medical record. 10. Click the Download Summary menu link to download a portable copy of your medical information. If you have questions, please visit the Frequently Asked Questions section of the [employer] website. Remember, [employer] is NOT to be used for urgent needs. For medical emergencies, dial 911. Now available from your iPhone and Android! Please provide this summary of care documentation to your next provider. Your primary care clinician is listed as Phys Other. If you have any questions after today's visit, please call [phone].

## 2018-09-05 PROBLEM — Z98.1 S/P LUMBAR FUSION: Status: ACTIVE | Noted: 2018-09-05

## 2018-09-05 PROBLEM — M47.816 LUMBAR FACET ARTHROPATHY: Status: ACTIVE | Noted: 2018-09-05

## 2018-09-05 PROBLEM — Z72.0 TOBACCO USE: Status: ACTIVE | Noted: 2018-09-05

## 2018-09-06 ENCOUNTER — DOCUMENTATION ONLY (OUTPATIENT)
Dept: ORTHOPEDIC SURGERY | Age: 58
End: 2018-09-06

## 2018-09-11 ENCOUNTER — DOCUMENTATION ONLY (OUTPATIENT)
Dept: ORTHOPEDIC SURGERY | Age: 58
End: 2018-09-11

## 2021-02-24 ENCOUNTER — TRANSCRIBE ORDER (OUTPATIENT)
Dept: REGISTRATION | Age: 61
End: 2021-02-24

## 2021-02-24 ENCOUNTER — HOSPITAL ENCOUNTER (OUTPATIENT)
Dept: GENERAL RADIOLOGY | Age: 61
Discharge: HOME | End: 2021-02-24
Payer: COMMERCIAL

## 2021-02-24 DIAGNOSIS — M17.10 PRIMARY LOCALIZED OSTEOARTHROSIS, LOWER LEG: ICD-10-CM

## 2021-02-24 DIAGNOSIS — M17.10 PRIMARY LOCALIZED OSTEOARTHROSIS, LOWER LEG: Primary | ICD-10-CM

## 2021-02-24 PROCEDURE — 73562 X-RAY EXAM OF KNEE 3: CPT

## 2022-03-19 PROBLEM — Z98.1 S/P LUMBAR FUSION: Status: ACTIVE | Noted: 2018-09-05

## 2022-03-20 PROBLEM — Z72.0 TOBACCO USE: Status: ACTIVE | Noted: 2018-09-05

## 2022-03-20 PROBLEM — M47.816 LUMBAR FACET ARTHROPATHY: Status: ACTIVE | Noted: 2018-09-05

## 2023-03-24 ENCOUNTER — HOSPITAL ENCOUNTER (OUTPATIENT)
Facility: HOSPITAL | Age: 63
Discharge: HOME | End: 2023-03-24
Payer: COMMERCIAL

## 2023-03-24 DIAGNOSIS — Z87.891 PERSONAL HISTORY OF TOBACCO USE, PRESENTING HAZARDS TO HEALTH: ICD-10-CM

## 2023-03-24 PROCEDURE — 71271 CT THORAX LUNG CANCER SCR C-: CPT
